# Patient Record
Sex: FEMALE | Race: OTHER | NOT HISPANIC OR LATINO
[De-identification: names, ages, dates, MRNs, and addresses within clinical notes are randomized per-mention and may not be internally consistent; named-entity substitution may affect disease eponyms.]

---

## 2018-03-07 ENCOUNTER — RESULT REVIEW (OUTPATIENT)
Age: 33
End: 2018-03-07

## 2018-03-07 ENCOUNTER — INPATIENT (INPATIENT)
Facility: HOSPITAL | Age: 33
LOS: 1 days | Discharge: HOME | End: 2018-03-09
Attending: SPECIALIST | Admitting: SPECIALIST
Payer: COMMERCIAL

## 2018-03-07 VITALS — WEIGHT: 191.8 LBS | HEIGHT: 65 IN

## 2018-03-07 DIAGNOSIS — Z3A.00 WEEKS OF GESTATION OF PREGNANCY NOT SPECIFIED: ICD-10-CM

## 2018-03-07 DIAGNOSIS — D64.9 ANEMIA, UNSPECIFIED: ICD-10-CM

## 2018-03-07 DIAGNOSIS — Z87.42 PERSONAL HISTORY OF OTHER DISEASES OF THE FEMALE GENITAL TRACT: ICD-10-CM

## 2018-03-07 DIAGNOSIS — O26.899 OTHER SPECIFIED PREGNANCY RELATED CONDITIONS, UNSPECIFIED TRIMESTER: ICD-10-CM

## 2018-03-07 DIAGNOSIS — O99.89 OTHER SPECIFIED DISEASES AND CONDITIONS COMPLICATING PREGNANCY, CHILDBIRTH AND THE PUERPERIUM: ICD-10-CM

## 2018-03-07 LAB
ALBUMIN SERPL ELPH-MCNC: 3.5 G/DL — SIGNIFICANT CHANGE UP (ref 3.3–5)
ALP SERPL-CCNC: 184 U/L — HIGH (ref 40–120)
ALT FLD-CCNC: 16 U/L — SIGNIFICANT CHANGE UP (ref 10–45)
ANION GAP SERPL CALC-SCNC: 17 MMOL/L — SIGNIFICANT CHANGE UP (ref 5–17)
APPEARANCE UR: (no result)
AST SERPL-CCNC: 30 U/L — SIGNIFICANT CHANGE UP (ref 10–40)
BASOPHILS NFR BLD AUTO: 0.1 % — SIGNIFICANT CHANGE UP (ref 0–2)
BILIRUB SERPL-MCNC: 0.4 MG/DL — SIGNIFICANT CHANGE UP (ref 0.2–1.2)
BILIRUB UR-MCNC: NEGATIVE — SIGNIFICANT CHANGE UP
BLD GP AB SCN SERPL QL: NEGATIVE — SIGNIFICANT CHANGE UP
BUN SERPL-MCNC: 7 MG/DL — SIGNIFICANT CHANGE UP (ref 7–23)
CALCIUM SERPL-MCNC: 9.4 MG/DL — SIGNIFICANT CHANGE UP (ref 8.4–10.5)
CHLORIDE SERPL-SCNC: 101 MMOL/L — SIGNIFICANT CHANGE UP (ref 96–108)
CO2 SERPL-SCNC: 16 MMOL/L — LOW (ref 22–31)
COLOR SPEC: YELLOW — SIGNIFICANT CHANGE UP
CREAT SERPL-MCNC: 0.61 MG/DL — SIGNIFICANT CHANGE UP (ref 0.5–1.3)
DIFF PNL FLD: (no result)
GLUCOSE SERPL-MCNC: 118 MG/DL — HIGH (ref 70–99)
GLUCOSE UR QL: NEGATIVE — SIGNIFICANT CHANGE UP
HCT VFR BLD CALC: 40.9 % — SIGNIFICANT CHANGE UP (ref 34.5–45)
HGB BLD-MCNC: 14.3 G/DL — SIGNIFICANT CHANGE UP (ref 11.5–15.5)
KETONES UR-MCNC: >=80 MG/DL
LEUKOCYTE ESTERASE UR-ACNC: NEGATIVE — SIGNIFICANT CHANGE UP
LYMPHOCYTES # BLD AUTO: 5.9 % — LOW (ref 13–44)
MCHC RBC-ENTMCNC: 32.3 PG — SIGNIFICANT CHANGE UP (ref 27–34)
MCHC RBC-ENTMCNC: 35 G/DL — SIGNIFICANT CHANGE UP (ref 32–36)
MCV RBC AUTO: 92.3 FL — SIGNIFICANT CHANGE UP (ref 80–100)
MONOCYTES NFR BLD AUTO: 4.2 % — SIGNIFICANT CHANGE UP (ref 2–14)
NEUTROPHILS NFR BLD AUTO: 89.8 % — HIGH (ref 43–77)
NITRITE UR-MCNC: NEGATIVE — SIGNIFICANT CHANGE UP
PH UR: 6.5 — SIGNIFICANT CHANGE UP (ref 5–8)
PLATELET # BLD AUTO: 160 K/UL — SIGNIFICANT CHANGE UP (ref 150–400)
POTASSIUM SERPL-MCNC: 4 MMOL/L — SIGNIFICANT CHANGE UP (ref 3.5–5.3)
POTASSIUM SERPL-SCNC: 4 MMOL/L — SIGNIFICANT CHANGE UP (ref 3.5–5.3)
PROT SERPL-MCNC: 7.2 G/DL — SIGNIFICANT CHANGE UP (ref 6–8.3)
PROT UR-MCNC: NEGATIVE MG/DL — SIGNIFICANT CHANGE UP
RBC # BLD: 4.43 M/UL — SIGNIFICANT CHANGE UP (ref 3.8–5.2)
RBC # FLD: 14.7 % — SIGNIFICANT CHANGE UP (ref 10.3–16.9)
RH IG SCN BLD-IMP: POSITIVE — SIGNIFICANT CHANGE UP
RH IG SCN BLD-IMP: POSITIVE — SIGNIFICANT CHANGE UP
SODIUM SERPL-SCNC: 134 MMOL/L — LOW (ref 135–145)
SP GR SPEC: 1.01 — SIGNIFICANT CHANGE UP (ref 1–1.03)
UROBILINOGEN FLD QL: 0.2 E.U./DL — SIGNIFICANT CHANGE UP
WBC # BLD: 12.6 K/UL — HIGH (ref 3.8–10.5)
WBC # FLD AUTO: 12.6 K/UL — HIGH (ref 3.8–10.5)

## 2018-03-07 RX ORDER — SIMETHICONE 80 MG/1
80 TABLET, CHEWABLE ORAL EVERY 6 HOURS
Qty: 0 | Refills: 0 | Status: DISCONTINUED | OUTPATIENT
Start: 2018-03-07 | End: 2018-03-09

## 2018-03-07 RX ORDER — DOCUSATE SODIUM 100 MG
100 CAPSULE ORAL THREE TIMES A DAY
Qty: 0 | Refills: 0 | Status: DISCONTINUED | OUTPATIENT
Start: 2018-03-07 | End: 2018-03-09

## 2018-03-07 RX ORDER — DIBUCAINE 1 %
1 OINTMENT (GRAM) RECTAL EVERY 4 HOURS
Qty: 0 | Refills: 0 | Status: DISCONTINUED | OUTPATIENT
Start: 2018-03-07 | End: 2018-03-09

## 2018-03-07 RX ORDER — DOCUSATE SODIUM 100 MG
100 CAPSULE ORAL
Qty: 0 | Refills: 0 | Status: DISCONTINUED | OUTPATIENT
Start: 2018-03-07 | End: 2018-03-08

## 2018-03-07 RX ORDER — SODIUM CHLORIDE 9 MG/ML
3 INJECTION INTRAMUSCULAR; INTRAVENOUS; SUBCUTANEOUS EVERY 8 HOURS
Qty: 0 | Refills: 0 | Status: DISCONTINUED | OUTPATIENT
Start: 2018-03-07 | End: 2018-03-09

## 2018-03-07 RX ORDER — HYDROCORTISONE 1 %
1 OINTMENT (GRAM) TOPICAL EVERY 4 HOURS
Qty: 0 | Refills: 0 | Status: DISCONTINUED | OUTPATIENT
Start: 2018-03-07 | End: 2018-03-09

## 2018-03-07 RX ORDER — DIPHENHYDRAMINE HCL 50 MG
25 CAPSULE ORAL EVERY 6 HOURS
Qty: 0 | Refills: 0 | Status: DISCONTINUED | OUTPATIENT
Start: 2018-03-07 | End: 2018-03-09

## 2018-03-07 RX ORDER — AER TRAVELER 0.5 G/1
1 SOLUTION RECTAL; TOPICAL EVERY 4 HOURS
Qty: 0 | Refills: 0 | Status: DISCONTINUED | OUTPATIENT
Start: 2018-03-07 | End: 2018-03-09

## 2018-03-07 RX ORDER — IBUPROFEN 200 MG
600 TABLET ORAL EVERY 6 HOURS
Qty: 0 | Refills: 0 | Status: DISCONTINUED | OUTPATIENT
Start: 2018-03-07 | End: 2018-03-09

## 2018-03-07 RX ORDER — SODIUM CHLORIDE 9 MG/ML
1000 INJECTION, SOLUTION INTRAVENOUS ONCE
Qty: 0 | Refills: 0 | Status: COMPLETED | OUTPATIENT
Start: 2018-03-07 | End: 2018-03-07

## 2018-03-07 RX ORDER — MAGNESIUM HYDROXIDE 400 MG/1
30 TABLET, CHEWABLE ORAL
Qty: 0 | Refills: 0 | Status: DISCONTINUED | OUTPATIENT
Start: 2018-03-07 | End: 2018-03-09

## 2018-03-07 RX ORDER — SODIUM CHLORIDE 9 MG/ML
1000 INJECTION, SOLUTION INTRAVENOUS
Qty: 0 | Refills: 0 | Status: DISCONTINUED | OUTPATIENT
Start: 2018-03-07 | End: 2018-03-07

## 2018-03-07 RX ORDER — CITRIC ACID/SODIUM CITRATE 300-500 MG
15 SOLUTION, ORAL ORAL EVERY 4 HOURS
Qty: 0 | Refills: 0 | Status: DISCONTINUED | OUTPATIENT
Start: 2018-03-07 | End: 2018-03-07

## 2018-03-07 RX ORDER — OXYTOCIN 10 UNIT/ML
41.67 VIAL (ML) INJECTION
Qty: 20 | Refills: 0 | Status: DISCONTINUED | OUTPATIENT
Start: 2018-03-07 | End: 2018-03-09

## 2018-03-07 RX ORDER — GLYCERIN ADULT
1 SUPPOSITORY, RECTAL RECTAL AT BEDTIME
Qty: 0 | Refills: 0 | Status: DISCONTINUED | OUTPATIENT
Start: 2018-03-07 | End: 2018-03-09

## 2018-03-07 RX ORDER — ACETAMINOPHEN 500 MG
650 TABLET ORAL EVERY 6 HOURS
Qty: 0 | Refills: 0 | Status: DISCONTINUED | OUTPATIENT
Start: 2018-03-07 | End: 2018-03-09

## 2018-03-07 RX ORDER — OXYTOCIN 10 UNIT/ML
1 VIAL (ML) INJECTION
Qty: 30 | Refills: 0 | Status: DISCONTINUED | OUTPATIENT
Start: 2018-03-07 | End: 2018-03-09

## 2018-03-07 RX ORDER — PRAMOXINE HYDROCHLORIDE 150 MG/15G
1 AEROSOL, FOAM RECTAL EVERY 4 HOURS
Qty: 0 | Refills: 0 | Status: DISCONTINUED | OUTPATIENT
Start: 2018-03-07 | End: 2018-03-09

## 2018-03-07 RX ORDER — OXYCODONE AND ACETAMINOPHEN 5; 325 MG/1; MG/1
2 TABLET ORAL EVERY 6 HOURS
Qty: 0 | Refills: 0 | Status: DISCONTINUED | OUTPATIENT
Start: 2018-03-07 | End: 2018-03-09

## 2018-03-07 RX ORDER — OXYTOCIN 10 UNIT/ML
333.33 VIAL (ML) INJECTION
Qty: 20 | Refills: 0 | Status: DISCONTINUED | OUTPATIENT
Start: 2018-03-07 | End: 2018-03-07

## 2018-03-07 RX ORDER — TETANUS TOXOID, REDUCED DIPHTHERIA TOXOID AND ACELLULAR PERTUSSIS VACCINE, ADSORBED 5; 2.5; 8; 8; 2.5 [IU]/.5ML; [IU]/.5ML; UG/.5ML; UG/.5ML; UG/.5ML
0.5 SUSPENSION INTRAMUSCULAR ONCE
Qty: 0 | Refills: 0 | Status: COMPLETED | OUTPATIENT
Start: 2018-03-07 | End: 2019-02-03

## 2018-03-07 RX ORDER — LANOLIN
1 OINTMENT (GRAM) TOPICAL EVERY 6 HOURS
Qty: 0 | Refills: 0 | Status: DISCONTINUED | OUTPATIENT
Start: 2018-03-07 | End: 2018-03-09

## 2018-03-07 RX ADMIN — Medication 1 MILLIUNIT(S)/MIN: at 07:37

## 2018-03-07 RX ADMIN — SODIUM CHLORIDE 2000 MILLILITER(S): 9 INJECTION, SOLUTION INTRAVENOUS at 05:01

## 2018-03-07 RX ADMIN — SODIUM CHLORIDE 3 MILLILITER(S): 9 INJECTION INTRAMUSCULAR; INTRAVENOUS; SUBCUTANEOUS at 22:44

## 2018-03-07 RX ADMIN — Medication 600 MILLIGRAM(S): at 21:58

## 2018-03-07 RX ADMIN — SODIUM CHLORIDE 3 MILLILITER(S): 9 INJECTION INTRAMUSCULAR; INTRAVENOUS; SUBCUTANEOUS at 15:00

## 2018-03-07 RX ADMIN — SODIUM CHLORIDE 125 MILLILITER(S): 9 INJECTION, SOLUTION INTRAVENOUS at 09:37

## 2018-03-07 RX ADMIN — Medication 600 MILLIGRAM(S): at 22:50

## 2018-03-07 RX ADMIN — Medication 100 MILLIGRAM(S): at 21:58

## 2018-03-08 ENCOUNTER — TRANSCRIPTION ENCOUNTER (OUTPATIENT)
Age: 33
End: 2018-03-08

## 2018-03-08 LAB
HCT VFR BLD CALC: 24.6 % — LOW (ref 34.5–45)
HGB BLD-MCNC: 8.1 G/DL — LOW (ref 11.5–15.5)
MCHC RBC-ENTMCNC: 31.8 PG — SIGNIFICANT CHANGE UP (ref 27–34)
MCHC RBC-ENTMCNC: 32.9 G/DL — SIGNIFICANT CHANGE UP (ref 32–36)
MCV RBC AUTO: 96.5 FL — SIGNIFICANT CHANGE UP (ref 80–100)
PLATELET # BLD AUTO: 123 K/UL — LOW (ref 150–400)
RBC # BLD: 2.55 M/UL — LOW (ref 3.8–5.2)
RBC # FLD: 14.6 % — SIGNIFICANT CHANGE UP (ref 10.3–16.9)
T PALLIDUM AB TITR SER: NEGATIVE — SIGNIFICANT CHANGE UP
WBC # BLD: 18.7 K/UL — HIGH (ref 3.8–10.5)
WBC # FLD AUTO: 18.7 K/UL — HIGH (ref 3.8–10.5)

## 2018-03-08 RX ORDER — IBUPROFEN 200 MG
1 TABLET ORAL
Qty: 0 | Refills: 0 | DISCHARGE
Start: 2018-03-08

## 2018-03-08 RX ORDER — DOCUSATE SODIUM 100 MG
1 CAPSULE ORAL
Qty: 0 | Refills: 0 | DISCHARGE
Start: 2018-03-08

## 2018-03-08 RX ORDER — FERROUS SULFATE 325(65) MG
325 TABLET ORAL
Qty: 0 | Refills: 0 | Status: DISCONTINUED | OUTPATIENT
Start: 2018-03-08 | End: 2018-03-09

## 2018-03-08 RX ADMIN — Medication 600 MILLIGRAM(S): at 21:07

## 2018-03-08 RX ADMIN — Medication 600 MILLIGRAM(S): at 15:07

## 2018-03-08 RX ADMIN — SODIUM CHLORIDE 3 MILLILITER(S): 9 INJECTION INTRAMUSCULAR; INTRAVENOUS; SUBCUTANEOUS at 07:09

## 2018-03-08 RX ADMIN — Medication 600 MILLIGRAM(S): at 10:00

## 2018-03-08 RX ADMIN — Medication 600 MILLIGRAM(S): at 03:28

## 2018-03-08 RX ADMIN — Medication 600 MILLIGRAM(S): at 03:09

## 2018-03-08 RX ADMIN — Medication 325 MILLIGRAM(S): at 14:07

## 2018-03-08 RX ADMIN — Medication 1 TABLET(S): at 14:06

## 2018-03-08 RX ADMIN — Medication 600 MILLIGRAM(S): at 09:24

## 2018-03-08 RX ADMIN — SODIUM CHLORIDE 3 MILLILITER(S): 9 INJECTION INTRAMUSCULAR; INTRAVENOUS; SUBCUTANEOUS at 22:17

## 2018-03-08 RX ADMIN — Medication 100 MILLIGRAM(S): at 14:09

## 2018-03-08 RX ADMIN — Medication 100 MILLIGRAM(S): at 07:14

## 2018-03-08 RX ADMIN — Medication 325 MILLIGRAM(S): at 22:21

## 2018-03-08 RX ADMIN — Medication 600 MILLIGRAM(S): at 22:17

## 2018-03-08 RX ADMIN — Medication 600 MILLIGRAM(S): at 15:53

## 2018-03-08 RX ADMIN — SODIUM CHLORIDE 3 MILLILITER(S): 9 INJECTION INTRAMUSCULAR; INTRAVENOUS; SUBCUTANEOUS at 14:34

## 2018-03-08 RX ADMIN — Medication 100 MILLIGRAM(S): at 22:21

## 2018-03-08 NOTE — PROGRESS NOTE ADULT - ASSESSMENT
A/P 32y s/p , PPD # 1 , stable, meeting postpartum milestones   - Pain: well controlled on motrin  - s/p vaginal packing , Hgb 8.1 this morning, no symptoms of anemia, iron TID with colace  - GI: Tolerating regular diet, colace PRN  - : urinating without difficulty/pain  -DVT prophylaxis: ambulating frequently  -Dispo: PPD 2, unless otherwise specified

## 2018-03-08 NOTE — DISCHARGE NOTE OB - CARE PLAN
Principal Discharge DX:	40 weeks gestation of pregnancy  Goal:	be happy  Assessment and plan of treatment:	regular diet, ambulate, breastfeed

## 2018-03-08 NOTE — DISCHARGE NOTE OB - MEDICATION SUMMARY - MEDICATIONS TO TAKE
I will START or STAY ON the medications listed below when I get home from the hospital:    ibuprofen 600 mg oral tablet  -- 1 tab(s) by mouth every 6 hours, As needed, Moderate Pain  -- Indication: For Weeks of gestation of pregnancy not specified    docusate sodium 100 mg oral capsule  -- 1 cap(s) by mouth 3 times a day  -- Indication: For Weeks of gestation of pregnancy not specified

## 2018-03-08 NOTE — LACTATION INITIAL EVALUATION - NS LACT CON REASON FOR REQ
no latch x24 hours/Met dyad at ~26 hours of life. Voided and med'd. Pt states she has been attempting to breastfeed but is not sure baby has ever latched. Assisted in placing baby skin to skin, attempt to latch in laid back position. Baby opens wide and latches but repeatedly pops off breast after one cycle. Demonstrated hand expression and finger fed several drops of colostrum to baby. Baby then latches and sustained suck/swallow pattern for 5-10 minutes before coming off the breast asleep. Encouraged frequent skin to skin and frequent on demand feeds at least 8-12x/day, hand express and offer colostrum via finger to coax baby to the breast and/or for any missed/unsuccessful feeds. Answered basic breastfeeding questions for patient and partner, plan of care discussed with primary RN, encouraged to call for further assistance as needed./primaparous mom

## 2018-03-08 NOTE — PROGRESS NOTE ADULT - SUBJECTIVE AND OBJECTIVE BOX
Patient evaluated at bedside this morning, resting comfortable in bed, no acute events overnight.  She reports pain is well controlled with motrin. States she is feeling well and asymptomatic. Requesting lactation consultation today.   She denies headache, dizziness, chest pain, palpitations, shortness of breath, nausea, vomiting, heavy vaginal bleeding or perineal discomfort. Reports decrease in amount of vaginal bleeding and denies clots.  She has been ambulating without assistance, voiding spontaneously, and is breastfeeding.   Tolerating food well, without nausea/vomit.  Passing flatus.     Physical Exam:  T(C): 36.7 (03-08-18 @ 08:05), Max: 36.7 (03-08-18 @ 08:05)  HR: 89 (03-08-18 @ 08:05) (89 - 91)  BP: 117/72 (03-08-18 @ 08:05) (101/63 - 117/72)  RR: 18 (03-08-18 @ 08:05) (17 - 18)  SpO2: 97% (03-08-18 @ 08:05) (97% - 98%)    GA: NAD, A&O x 3  CV: RRR, no murmurs, rubs, or gallops  Pulm: clear breath sounds throughout, no rales, rhonchi, wheezes  Abd: + BS, soft, nontender, nondistended, no rebound or guarding, uterus firm at midline, uterus firm and   2 fb below umbilicus  Extremities: no swelling or calf tenderness                          8.1    18.7  )-----------( 123      ( 08 Mar 2018 05:45 )             24.6     03-07    134<L>  |  101  |  7   ----------------------------<  118<H>  4.0   |  16<L>  |  0.61    Ca    9.4      07 Mar 2018 04:06    TPro  7.2  /  Alb  3.5  /  TBili  0.4  /  DBili  x   /  AST  30  /  ALT  16  /  AlkPhos  184<H>  03-07    acetaminophen   Tablet 650 milliGRAM(s) Oral every 6 hours PRN  acetaminophen   Tablet. 650 milliGRAM(s) Oral every 6 hours PRN  dibucaine 1% Ointment 1 Application(s) Topical every 4 hours PRN  diphenhydrAMINE   Capsule 25 milliGRAM(s) Oral every 6 hours PRN  diphtheria/tetanus/pertussis (acellular) Vaccine (ADAcel) 0.5 milliLiter(s) IntraMuscular once  docusate sodium 100 milliGRAM(s) Oral three times a day  fentaNYL (2 MICROgram(s)/mL) + BUpivacaine 0.1% in 0.9% Sodium Chloride PCEA 250 milliLiter(s) Epidural PCA Continuous  glycerin Suppository - Adult 1 Suppository(s) Rectal at bedtime PRN  hydrocortisone 1% Cream 1 Application(s) Topical every 4 hours PRN  ibuprofen  Tablet 600 milliGRAM(s) Oral every 6 hours PRN  lanolin Ointment 1 Application(s) Topical every 6 hours PRN  magnesium hydroxide Suspension 30 milliLiter(s) Oral two times a day PRN  oxyCODONE    5 mG/acetaminophen 325 mG 2 Tablet(s) Oral every 6 hours PRN  oxytocin Infusion 41.667 milliUNIT(s)/Min IV Continuous <Continuous>  oxytocin Infusion 1 milliUNIT(s)/Min IV Continuous <Continuous>  pramoxine 1%/zinc 5% Cream 1 Application(s) Topical every 4 hours PRN  prenatal multivitamin 1 Tablet(s) Oral daily  simethicone 80 milliGRAM(s) Chew every 6 hours PRN  sodium chloride 0.9% lock flush 3 milliLiter(s) IV Push every 8 hours  witch hazel Pads 1 Application(s) Topical every 4 hours PRN

## 2018-03-08 NOTE — DISCHARGE NOTE OB - PATIENT PORTAL LINK FT
You can access the [facility] Patient Portal, offered by [facility], by registering with the following website: [email or web address]

## 2018-03-08 NOTE — DISCHARGE NOTE OB - CARE PROVIDER_API CALL
Merlyn Clark), Obstetrics and Gynecology  [address] [place] [address]  Phone: [phone]  Fax: [phone]

## 2018-03-09 VITALS
OXYGEN SATURATION: 98 % | HEART RATE: 94 BPM | DIASTOLIC BLOOD PRESSURE: 80 MMHG | RESPIRATION RATE: 18 BRPM | TEMPERATURE: 98 F | SYSTOLIC BLOOD PRESSURE: 136 MMHG

## 2018-03-09 LAB — SURGICAL PATHOLOGY STUDY: SIGNIFICANT CHANGE UP

## 2018-03-09 PROCEDURE — 85025 COMPLETE CBC W/AUTO DIFF WBC: CPT

## 2018-03-09 PROCEDURE — 99214 OFFICE O/P EST MOD 30 MIN: CPT

## 2018-03-09 PROCEDURE — 80053 COMPREHEN METABOLIC PANEL: CPT

## 2018-03-09 PROCEDURE — 84550 ASSAY OF BLOOD/URIC ACID: CPT

## 2018-03-09 PROCEDURE — 86900 BLOOD TYPING SEROLOGIC ABO: CPT

## 2018-03-09 PROCEDURE — 86850 RBC ANTIBODY SCREEN: CPT

## 2018-03-09 PROCEDURE — 85027 COMPLETE CBC AUTOMATED: CPT

## 2018-03-09 PROCEDURE — 83615 LACTATE (LD) (LDH) ENZYME: CPT

## 2018-03-09 PROCEDURE — 86901 BLOOD TYPING SEROLOGIC RH(D): CPT

## 2018-03-09 PROCEDURE — 88307 TISSUE EXAM BY PATHOLOGIST: CPT

## 2018-03-09 PROCEDURE — 90715 TDAP VACCINE 7 YRS/> IM: CPT

## 2018-03-09 PROCEDURE — 36415 COLL VENOUS BLD VENIPUNCTURE: CPT

## 2018-03-09 PROCEDURE — 81001 URINALYSIS AUTO W/SCOPE: CPT

## 2018-03-09 PROCEDURE — 86780 TREPONEMA PALLIDUM: CPT

## 2018-03-09 RX ORDER — TETANUS TOXOID, REDUCED DIPHTHERIA TOXOID AND ACELLULAR PERTUSSIS VACCINE, ADSORBED 5; 2.5; 8; 8; 2.5 [IU]/.5ML; [IU]/.5ML; UG/.5ML; UG/.5ML; UG/.5ML
0.5 SUSPENSION INTRAMUSCULAR ONCE
Qty: 0 | Refills: 0 | Status: COMPLETED | OUTPATIENT
Start: 2018-03-09 | End: 2018-03-09

## 2018-03-09 RX ADMIN — Medication 600 MILLIGRAM(S): at 09:39

## 2018-03-09 RX ADMIN — Medication 600 MILLIGRAM(S): at 10:30

## 2018-03-09 RX ADMIN — Medication 600 MILLIGRAM(S): at 03:30

## 2018-03-09 RX ADMIN — Medication 1 TABLET(S): at 13:41

## 2018-03-09 RX ADMIN — Medication 100 MILLIGRAM(S): at 06:37

## 2018-03-09 RX ADMIN — TETANUS TOXOID, REDUCED DIPHTHERIA TOXOID AND ACELLULAR PERTUSSIS VACCINE, ADSORBED 0.5 MILLILITER(S): 5; 2.5; 8; 8; 2.5 SUSPENSION INTRAMUSCULAR at 13:41

## 2018-03-09 RX ADMIN — Medication 600 MILLIGRAM(S): at 04:00

## 2018-03-09 RX ADMIN — Medication 325 MILLIGRAM(S): at 06:37

## 2018-03-09 RX ADMIN — Medication 325 MILLIGRAM(S): at 13:41

## 2018-03-09 RX ADMIN — Medication 100 MILLIGRAM(S): at 13:41

## 2018-03-09 NOTE — PROGRESS NOTE ADULT - SUBJECTIVE AND OBJECTIVE BOX
Patient evaluated at bedside this morning, resting comfortable in bed, no acute events overnight.  She reports pain is well controlled with motrin. States she is feeling well and denies all symptoms of anemia. Taking iron three times per day with colace.  She denies headache, dizziness, chest pain, palpitations, shortness of breath, nausea, vomiting, heavy vaginal bleeding or perineal discomfort. Reports decrease in amount of vaginal bleeding and denies clots.  She has been ambulating without assistance, voiding spontaneously, and is breastfeeding.   Tolerating food well, without nausea/vomit.  Passing flatus.     Physical Exam:  T(C): 36.8 (03-09-18 @ 06:00), Max: 36.8 (03-09-18 @ 06:00)  HR: 93 (03-09-18 @ 06:00) (93 - 93)  BP: 106/65 (03-09-18 @ 06:00) (106/65 - 106/65)  RR: 18 (03-09-18 @ 06:00) (18 - 18)  SpO2: 98% (03-09-18 @ 06:00) (98% - 98%)    GA: NAD, A&O x 3  CV: RRR, no murmurs, rubs, or gallops  Pulm: clear breath sounds throughout, no rales, rhonchi, wheezes  Abd: + BS, soft, nontender, nondistended, no rebound or guarding, uterus firm at midline, uterus firm and  2 fb below umbilicus  : lochia WNL  Extremities: no swelling or calf tenderness                          8.1    18.7  )-----------( 123      ( 08 Mar 2018 05:45 )             24.6           acetaminophen   Tablet 650 milliGRAM(s) Oral every 6 hours PRN  acetaminophen   Tablet. 650 milliGRAM(s) Oral every 6 hours PRN  dibucaine 1% Ointment 1 Application(s) Topical every 4 hours PRN  diphenhydrAMINE   Capsule 25 milliGRAM(s) Oral every 6 hours PRN  diphtheria/tetanus/pertussis (acellular) Vaccine (ADAcel) 0.5 milliLiter(s) IntraMuscular once  docusate sodium 100 milliGRAM(s) Oral three times a day  fentaNYL (2 MICROgram(s)/mL) + BUpivacaine 0.1% in 0.9% Sodium Chloride PCEA 250 milliLiter(s) Epidural PCA Continuous  ferrous    sulfate 325 milliGRAM(s) Oral three times a day with meals  glycerin Suppository - Adult 1 Suppository(s) Rectal at bedtime PRN  hydrocortisone 1% Cream 1 Application(s) Topical every 4 hours PRN  ibuprofen  Tablet 600 milliGRAM(s) Oral every 6 hours PRN  lanolin Ointment 1 Application(s) Topical every 6 hours PRN  magnesium hydroxide Suspension 30 milliLiter(s) Oral two times a day PRN  oxyCODONE    5 mG/acetaminophen 325 mG 2 Tablet(s) Oral every 6 hours PRN  oxytocin Infusion 41.667 milliUNIT(s)/Min IV Continuous <Continuous>  oxytocin Infusion 1 milliUNIT(s)/Min IV Continuous <Continuous>  pramoxine 1%/zinc 5% Cream 1 Application(s) Topical every 4 hours PRN  prenatal multivitamin 1 Tablet(s) Oral daily  simethicone 80 milliGRAM(s) Chew every 6 hours PRN  sodium chloride 0.9% lock flush 3 milliLiter(s) IV Push every 8 hours  witch hazel Pads 1 Application(s) Topical every 4 hours PRN

## 2018-03-09 NOTE — PROGRESS NOTE ADULT - ASSESSMENT
A/P 32y s/p , PPD #2  , stable, meeting postpartum milestones   - Pain: well controlled on motrin  - Anemia: post delivery Hgb 8.1, on iron TID, no symptoms of anemia and VSS  - GI: Tolerating regular diet, colace PRN  - : urinating without difficulty/pain  -DVT prophylaxis: ambulating frequently  -Dispo: PPD 2, d/c instructions discussed

## 2018-03-13 DIAGNOSIS — Z3A.39 39 WEEKS GESTATION OF PREGNANCY: ICD-10-CM

## 2018-03-13 DIAGNOSIS — Z34.90 ENCOUNTER FOR SUPERVISION OF NORMAL PREGNANCY, UNSPECIFIED, UNSPECIFIED TRIMESTER: ICD-10-CM

## 2018-04-17 PROBLEM — Z00.00 ENCOUNTER FOR PREVENTIVE HEALTH EXAMINATION: Status: ACTIVE | Noted: 2018-04-17

## 2018-04-24 ENCOUNTER — OTHER (OUTPATIENT)
Age: 33
End: 2018-04-24

## 2018-04-27 ENCOUNTER — APPOINTMENT (OUTPATIENT)
Dept: BREAST CENTER | Facility: CLINIC | Age: 33
End: 2018-04-27

## 2019-11-12 ENCOUNTER — TRANSCRIPTION ENCOUNTER (OUTPATIENT)
Age: 34
End: 2019-11-12

## 2019-11-13 ENCOUNTER — INPATIENT (INPATIENT)
Facility: HOSPITAL | Age: 34
LOS: 1 days | Discharge: HOME | End: 2019-11-15
Attending: OBSTETRICS & GYNECOLOGY | Admitting: OBSTETRICS & GYNECOLOGY
Payer: COMMERCIAL

## 2019-11-13 VITALS — WEIGHT: 194.01 LBS | HEIGHT: 65 IN

## 2019-11-13 DIAGNOSIS — O26.899 OTHER SPECIFIED PREGNANCY RELATED CONDITIONS, UNSPECIFIED TRIMESTER: ICD-10-CM

## 2019-11-13 DIAGNOSIS — Z34.80 ENCOUNTER FOR SUPERVISION OF OTHER NORMAL PREGNANCY, UNSPECIFIED TRIMESTER: ICD-10-CM

## 2019-11-13 DIAGNOSIS — Z3A.00 WEEKS OF GESTATION OF PREGNANCY NOT SPECIFIED: ICD-10-CM

## 2019-11-13 LAB
BASOPHILS # BLD AUTO: 0.02 K/UL — SIGNIFICANT CHANGE UP (ref 0–0.2)
BASOPHILS NFR BLD AUTO: 0.2 % — SIGNIFICANT CHANGE UP (ref 0–2)
BLD GP AB SCN SERPL QL: NEGATIVE — SIGNIFICANT CHANGE UP
EOSINOPHIL # BLD AUTO: 0.02 K/UL — SIGNIFICANT CHANGE UP (ref 0–0.5)
EOSINOPHIL NFR BLD AUTO: 0.2 % — SIGNIFICANT CHANGE UP (ref 0–6)
HCT VFR BLD CALC: 37.7 % — SIGNIFICANT CHANGE UP (ref 34.5–45)
HGB BLD-MCNC: 13 G/DL — SIGNIFICANT CHANGE UP (ref 11.5–15.5)
IMM GRANULOCYTES NFR BLD AUTO: 0.4 % — SIGNIFICANT CHANGE UP (ref 0–1.5)
LYMPHOCYTES # BLD AUTO: 1.27 K/UL — SIGNIFICANT CHANGE UP (ref 1–3.3)
LYMPHOCYTES # BLD AUTO: 11.5 % — LOW (ref 13–44)
MCHC RBC-ENTMCNC: 30.4 PG — SIGNIFICANT CHANGE UP (ref 27–34)
MCHC RBC-ENTMCNC: 34.5 GM/DL — SIGNIFICANT CHANGE UP (ref 32–36)
MCV RBC AUTO: 88.1 FL — SIGNIFICANT CHANGE UP (ref 80–100)
MONOCYTES # BLD AUTO: 0.62 K/UL — SIGNIFICANT CHANGE UP (ref 0–0.9)
MONOCYTES NFR BLD AUTO: 5.6 % — SIGNIFICANT CHANGE UP (ref 2–14)
NEUTROPHILS # BLD AUTO: 9.04 K/UL — HIGH (ref 1.8–7.4)
NEUTROPHILS NFR BLD AUTO: 82.1 % — HIGH (ref 43–77)
NRBC # BLD: 0 /100 WBCS — SIGNIFICANT CHANGE UP (ref 0–0)
PLATELET # BLD AUTO: 184 K/UL — SIGNIFICANT CHANGE UP (ref 150–400)
RBC # BLD: 4.28 M/UL — SIGNIFICANT CHANGE UP (ref 3.8–5.2)
RBC # FLD: 15.2 % — HIGH (ref 10.3–14.5)
RH IG SCN BLD-IMP: POSITIVE — SIGNIFICANT CHANGE UP
RH IG SCN BLD-IMP: POSITIVE — SIGNIFICANT CHANGE UP
T PALLIDUM AB TITR SER: NEGATIVE — SIGNIFICANT CHANGE UP
WBC # BLD: 11.01 K/UL — HIGH (ref 3.8–10.5)
WBC # FLD AUTO: 11.01 K/UL — HIGH (ref 3.8–10.5)

## 2019-11-13 PROCEDURE — 88307 TISSUE EXAM BY PATHOLOGIST: CPT | Mod: 26

## 2019-11-13 PROCEDURE — 59409 OBSTETRICAL CARE: CPT

## 2019-11-13 RX ORDER — DIPHENHYDRAMINE HCL 50 MG
25 CAPSULE ORAL EVERY 6 HOURS
Refills: 0 | Status: DISCONTINUED | OUTPATIENT
Start: 2019-11-13 | End: 2019-11-15

## 2019-11-13 RX ORDER — OXYCODONE HYDROCHLORIDE 5 MG/1
5 TABLET ORAL ONCE
Refills: 0 | Status: DISCONTINUED | OUTPATIENT
Start: 2019-11-13 | End: 2019-11-15

## 2019-11-13 RX ORDER — GLYCERIN ADULT
1 SUPPOSITORY, RECTAL RECTAL AT BEDTIME
Refills: 0 | Status: DISCONTINUED | OUTPATIENT
Start: 2019-11-13 | End: 2019-11-15

## 2019-11-13 RX ORDER — DIBUCAINE 1 %
1 OINTMENT (GRAM) RECTAL EVERY 6 HOURS
Refills: 0 | Status: DISCONTINUED | OUTPATIENT
Start: 2019-11-13 | End: 2019-11-15

## 2019-11-13 RX ORDER — BENZOCAINE 10 %
1 GEL (GRAM) MUCOUS MEMBRANE EVERY 6 HOURS
Refills: 0 | Status: DISCONTINUED | OUTPATIENT
Start: 2019-11-13 | End: 2019-11-15

## 2019-11-13 RX ORDER — SODIUM CHLORIDE 9 MG/ML
1000 INJECTION, SOLUTION INTRAVENOUS
Refills: 0 | Status: DISCONTINUED | OUTPATIENT
Start: 2019-11-13 | End: 2019-11-13

## 2019-11-13 RX ORDER — SIMETHICONE 80 MG/1
80 TABLET, CHEWABLE ORAL EVERY 4 HOURS
Refills: 0 | Status: DISCONTINUED | OUTPATIENT
Start: 2019-11-13 | End: 2019-11-15

## 2019-11-13 RX ORDER — OXYCODONE HYDROCHLORIDE 5 MG/1
5 TABLET ORAL
Refills: 0 | Status: DISCONTINUED | OUTPATIENT
Start: 2019-11-13 | End: 2019-11-15

## 2019-11-13 RX ORDER — AER TRAVELER 0.5 G/1
1 SOLUTION RECTAL; TOPICAL EVERY 4 HOURS
Refills: 0 | Status: DISCONTINUED | OUTPATIENT
Start: 2019-11-13 | End: 2019-11-15

## 2019-11-13 RX ORDER — KETOROLAC TROMETHAMINE 30 MG/ML
30 SYRINGE (ML) INJECTION ONCE
Refills: 0 | Status: DISCONTINUED | OUTPATIENT
Start: 2019-11-13 | End: 2019-11-13

## 2019-11-13 RX ORDER — MAGNESIUM HYDROXIDE 400 MG/1
30 TABLET, CHEWABLE ORAL
Refills: 0 | Status: DISCONTINUED | OUTPATIENT
Start: 2019-11-13 | End: 2019-11-15

## 2019-11-13 RX ORDER — TETANUS TOXOID, REDUCED DIPHTHERIA TOXOID AND ACELLULAR PERTUSSIS VACCINE, ADSORBED 5; 2.5; 8; 8; 2.5 [IU]/.5ML; [IU]/.5ML; UG/.5ML; UG/.5ML; UG/.5ML
0.5 SUSPENSION INTRAMUSCULAR ONCE
Refills: 0 | Status: COMPLETED | OUTPATIENT
Start: 2019-11-13

## 2019-11-13 RX ORDER — CITRIC ACID/SODIUM CITRATE 300-500 MG
15 SOLUTION, ORAL ORAL EVERY 6 HOURS
Refills: 0 | Status: DISCONTINUED | OUTPATIENT
Start: 2019-11-13 | End: 2019-11-13

## 2019-11-13 RX ORDER — IBUPROFEN 200 MG
600 TABLET ORAL EVERY 6 HOURS
Refills: 0 | Status: DISCONTINUED | OUTPATIENT
Start: 2019-11-13 | End: 2019-11-15

## 2019-11-13 RX ORDER — HYDROCORTISONE 1 %
1 OINTMENT (GRAM) TOPICAL EVERY 6 HOURS
Refills: 0 | Status: DISCONTINUED | OUTPATIENT
Start: 2019-11-13 | End: 2019-11-15

## 2019-11-13 RX ORDER — ACETAMINOPHEN 500 MG
975 TABLET ORAL
Refills: 0 | Status: DISCONTINUED | OUTPATIENT
Start: 2019-11-13 | End: 2019-11-15

## 2019-11-13 RX ORDER — LANOLIN
1 OINTMENT (GRAM) TOPICAL EVERY 6 HOURS
Refills: 0 | Status: DISCONTINUED | OUTPATIENT
Start: 2019-11-13 | End: 2019-11-15

## 2019-11-13 RX ORDER — OXYTOCIN 10 UNIT/ML
333.33 VIAL (ML) INJECTION
Qty: 20 | Refills: 0 | Status: DISCONTINUED | OUTPATIENT
Start: 2019-11-13 | End: 2019-11-15

## 2019-11-13 RX ORDER — IBUPROFEN 200 MG
600 TABLET ORAL EVERY 6 HOURS
Refills: 0 | Status: COMPLETED | OUTPATIENT
Start: 2019-11-13 | End: 2020-10-11

## 2019-11-13 RX ORDER — PRAMOXINE HYDROCHLORIDE 150 MG/15G
1 AEROSOL, FOAM RECTAL EVERY 4 HOURS
Refills: 0 | Status: DISCONTINUED | OUTPATIENT
Start: 2019-11-13 | End: 2019-11-15

## 2019-11-13 RX ORDER — SODIUM CHLORIDE 9 MG/ML
3 INJECTION INTRAMUSCULAR; INTRAVENOUS; SUBCUTANEOUS EVERY 8 HOURS
Refills: 0 | Status: DISCONTINUED | OUTPATIENT
Start: 2019-11-13 | End: 2019-11-15

## 2019-11-13 RX ADMIN — Medication 975 MILLIGRAM(S): at 23:03

## 2019-11-13 RX ADMIN — Medication 1000 MILLIUNIT(S)/MIN: at 09:52

## 2019-11-13 RX ADMIN — Medication 975 MILLIGRAM(S): at 22:03

## 2019-11-13 RX ADMIN — Medication 30 MILLIGRAM(S): at 09:49

## 2019-11-13 NOTE — PATIENT PROFILE OB - TRANSPORTATION AVAILABLE, PROFILE
Wound Crusting?: clean Body Location Override (Optional): right distal forearm Sutures?: intact Follow Up Time Frame (Optional): days Wound Edema?: mild Follow Up Provider (Optional): Dr. Isael Hooper Wound Diameter In Cm(Optional): 0 Wound Evaluated By (Optional): Dr. Debora Rodriguez Detail Level: Detailed Follow Up Units (Optional): 3 Add 58820 Cpt? (Important Note: In 2017 The Use Of 10981 Is Being Tracked By Cms To Determine Future Global Period Reimbursement For Global Periods): yes family or friend will provide

## 2019-11-14 ENCOUNTER — TRANSCRIPTION ENCOUNTER (OUTPATIENT)
Age: 34
End: 2019-11-14

## 2019-11-14 LAB
HCT VFR BLD CALC: 32.1 % — LOW (ref 34.5–45)
HGB BLD-MCNC: 10.4 G/DL — LOW (ref 11.5–15.5)

## 2019-11-14 RX ORDER — ACETAMINOPHEN 500 MG
3 TABLET ORAL
Qty: 0 | Refills: 0 | DISCHARGE

## 2019-11-14 RX ORDER — TETANUS TOXOID, REDUCED DIPHTHERIA TOXOID AND ACELLULAR PERTUSSIS VACCINE, ADSORBED 5; 2.5; 8; 8; 2.5 [IU]/.5ML; [IU]/.5ML; UG/.5ML; UG/.5ML; UG/.5ML
0.5 SUSPENSION INTRAMUSCULAR ONCE
Refills: 0 | Status: COMPLETED | OUTPATIENT
Start: 2019-11-14 | End: 2019-11-14

## 2019-11-14 RX ORDER — IBUPROFEN 200 MG
3 TABLET ORAL
Qty: 0 | Refills: 0 | DISCHARGE

## 2019-11-14 RX ORDER — DOCUSATE SODIUM 100 MG
1 CAPSULE ORAL
Qty: 0 | Refills: 0 | DISCHARGE

## 2019-11-14 RX ADMIN — Medication 1 TABLET(S): at 12:57

## 2019-11-14 RX ADMIN — Medication 975 MILLIGRAM(S): at 09:15

## 2019-11-14 RX ADMIN — Medication 975 MILLIGRAM(S): at 23:48

## 2019-11-14 RX ADMIN — TETANUS TOXOID, REDUCED DIPHTHERIA TOXOID AND ACELLULAR PERTUSSIS VACCINE, ADSORBED 0.5 MILLILITER(S): 5; 2.5; 8; 8; 2.5 SUSPENSION INTRAMUSCULAR at 23:50

## 2019-11-14 RX ADMIN — Medication 600 MILLIGRAM(S): at 06:16

## 2019-11-14 RX ADMIN — Medication 600 MILLIGRAM(S): at 12:57

## 2019-11-14 RX ADMIN — Medication 600 MILLIGRAM(S): at 19:50

## 2019-11-14 RX ADMIN — Medication 975 MILLIGRAM(S): at 16:47

## 2019-11-14 RX ADMIN — Medication 975 MILLIGRAM(S): at 04:36

## 2019-11-14 RX ADMIN — Medication 600 MILLIGRAM(S): at 20:07

## 2019-11-14 RX ADMIN — Medication 975 MILLIGRAM(S): at 03:36

## 2019-11-14 RX ADMIN — Medication 975 MILLIGRAM(S): at 08:43

## 2019-11-14 RX ADMIN — Medication 600 MILLIGRAM(S): at 00:09

## 2019-11-14 RX ADMIN — Medication 600 MILLIGRAM(S): at 13:30

## 2019-11-14 RX ADMIN — Medication 975 MILLIGRAM(S): at 17:50

## 2019-11-14 RX ADMIN — Medication 600 MILLIGRAM(S): at 01:09

## 2019-11-14 NOTE — DISCHARGE NOTE OB - PATIENT PORTAL LINK FT
You can access the FollowMyHealth Patient Portal offered by [facility] by registering at the following website: [email or web address]. By joining [facility] FollowMyHealth portal, you will also be able to view your health information using other applications (apps) compatible with our system.

## 2019-11-14 NOTE — DISCHARGE NOTE OB - PLAN OF CARE
wellbeing After discharge, please stay on pelvic rest for 6 weeks, meaning no sexual intercourse, no tampons and no douching.  No driving for 2 weeks as women can loose a lot of blood during delivery and there is a possibility of being lightheaded/fainting.  No lifting objects heavier than baby for two weeks.  Expect to have vaginal bleeding/spotting for up to six weeks.  The bleeding should get lighter and more white/light brown with time.  For bleeding soaking more than a pad an hour or passing clots greater than the size of your fist, come in to the emergency department.    Follow up in clinic in 6 weeks.

## 2019-11-14 NOTE — DISCHARGE NOTE OB - CARE PROVIDER_API CALL
[facility],   [address] [place] [address]  2nd floor  Phone: [phone]  Fax: (   )    -  Follow Up Time:

## 2019-11-14 NOTE — DISCHARGE NOTE OB - ADDITIONAL INSTRUCTIONS
After discharge, please stay on pelvic rest for 6 weeks, meaning no sexual intercourse, no tampons and no douching.  No driving for 2 weeks as women can loose a lot of blood during delivery and there is a possibility of being lightheaded/fainting.  No lifting objects heavier than baby for two weeks.  Expect to have vaginal bleeding/spotting for up to six weeks.  The bleeding should get lighter and more white/light brown with time.  For bleeding soaking more than a pad an hour or passing clots greater than the size of your fist, come in to the emergency department.    Follow up in clinic in 6 weeks.

## 2019-11-14 NOTE — DISCHARGE NOTE OB - HOSPITAL COURSE
Patient had uncomplicated, nonsurgical vaginal delivery.  Please see delivery note for details.  During postpartum course patient's vitals were stable, vaginal bleeding appropriate, and pain well controlled.  On day of discharge patient was ambulating, her pain controlled with oral medications, had adequate oral intake, and was voiding freely.  Discharge instructions and precautions were given.  Will return to clinic in 6 weeks for postpartum visit.  Postpartum birth control plan is interval IUD.

## 2019-11-14 NOTE — DISCHARGE NOTE OB - CARE PLAN
Principal Discharge DX:	Vaginal delivery  Goal:	wellbeing  Assessment and plan of treatment:	After discharge, please stay on pelvic rest for 6 weeks, meaning no sexual intercourse, no tampons and no douching.  No driving for 2 weeks as women can loose a lot of blood during delivery and there is a possibility of being lightheaded/fainting.  No lifting objects heavier than baby for two weeks.  Expect to have vaginal bleeding/spotting for up to six weeks.  The bleeding should get lighter and more white/light brown with time.  For bleeding soaking more than a pad an hour or passing clots greater than the size of your fist, come in to the emergency department.    Follow up in clinic in 6 weeks.

## 2019-11-14 NOTE — DISCHARGE NOTE OB - PROVIDER TOKENS
FREE:[LAST:[[facility]],PHONE:[phone]],FAX:[(   )    -],ADDRESS:[[address] [place] [address]]]

## 2019-11-14 NOTE — DISCHARGE NOTE OB - MEDICATION SUMMARY - MEDICATIONS TO TAKE
I will START or STAY ON the medications listed below when I get home from the hospital:    acetaminophen 325 mg oral tablet  -- 3 tab(s) by mouth every 6 hours  -- Indication: For mild pain    ibuprofen 200 mg oral tablet  -- 3 tab(s) by mouth every 6 hours  -- Indication: For moderate pain    Colace 100 mg oral capsule  -- 1 cap(s) by mouth 2 times a day  -- Indication: For Stool softener

## 2019-11-14 NOTE — PROGRESS NOTE ADULT - PROBLEM SELECTOR PLAN 1
- Pain well controlled, continue current pain regimen  - Increase ambulation, SCDs when not ambulating  - Continue regular diet  - Baby boy to be circumcised pending clearance by pediatrics  - Pt will have interval IUD placed, declines bridging contraception  - f/u PPD#1 H&H    Alejandra Campos, PGY-1

## 2019-11-14 NOTE — PROGRESS NOTE ADULT - SUBJECTIVE AND OBJECTIVE BOX
OB Progress Note:  PPD#1    S: 33yo  PPD#1 s/p . Patient feels well. Pain is well controlled. She is tolerating a regular diet and passing flatus. She is voiding spontaneously, and ambulating without difficulty. Denies CP/SOB. Denies lightheadedness/dizziness. Denies N/V.    O:  Vitals:  Vital Signs Last 24 Hrs  T(C): 36.3 (2019 06:34), Max: 37.2 (2019 09:45)  T(F): 97.3 (2019 06:34), Max: 99 (2019 09:45)  HR: 74 (2019 06:34) (60 - 88)  BP: 106/65 (2019 06:34) (101/70 - 129/71)  BP(mean): 89 (2019 10:45) (89 - 89)  RR: 18 (2019 06:34) (16 - 18)  SpO2: 97% (2019 06:34) (96% - 98%)    MEDICATIONS  (STANDING):  acetaminophen   Tablet .. 975 milliGRAM(s) Oral <User Schedule>  diphtheria/tetanus/pertussis (acellular) Vaccine (ADAcel) 0.5 milliLiter(s) IntraMuscular once  ibuprofen  Tablet. 600 milliGRAM(s) Oral every 6 hours  oxytocin Infusion 333.333 milliUNIT(s)/Min (1000 mL/Hr) IV Continuous <Continuous>  oxytocin Infusion 333.333 milliUNIT(s)/Min (1000 mL/Hr) IV Continuous <Continuous>  oxytocin Infusion 333.333 milliUNIT(s)/Min (1000 mL/Hr) IV Continuous <Continuous>  prenatal multivitamin 1 Tablet(s) Oral daily  sodium chloride 0.9% lock flush 3 milliLiter(s) IV Push every 8 hours      Labs:  Blood type: O Positive  Rubella IgG: RPR: Negative                          13.0   11.01<H> >-----------< 184    (  @ 02:38 )             37.7      Physical Exam:  General: NAD  Abdomen: soft, non-tender, non-distended, fundus firm  Vaginal: Lochia wnl  Extremities: No erythema/edema

## 2019-11-15 VITALS
DIASTOLIC BLOOD PRESSURE: 69 MMHG | TEMPERATURE: 98 F | RESPIRATION RATE: 18 BRPM | HEART RATE: 70 BPM | SYSTOLIC BLOOD PRESSURE: 107 MMHG

## 2019-11-15 PROCEDURE — 85014 HEMATOCRIT: CPT

## 2019-11-15 PROCEDURE — 85027 COMPLETE CBC AUTOMATED: CPT

## 2019-11-15 PROCEDURE — G0463: CPT

## 2019-11-15 PROCEDURE — 88307 TISSUE EXAM BY PATHOLOGIST: CPT

## 2019-11-15 PROCEDURE — 86901 BLOOD TYPING SEROLOGIC RH(D): CPT

## 2019-11-15 PROCEDURE — 86780 TREPONEMA PALLIDUM: CPT

## 2019-11-15 PROCEDURE — 59025 FETAL NON-STRESS TEST: CPT

## 2019-11-15 PROCEDURE — 90715 TDAP VACCINE 7 YRS/> IM: CPT

## 2019-11-15 PROCEDURE — 86850 RBC ANTIBODY SCREEN: CPT

## 2019-11-15 PROCEDURE — 59050 FETAL MONITOR W/REPORT: CPT

## 2019-11-15 PROCEDURE — 86900 BLOOD TYPING SEROLOGIC ABO: CPT

## 2019-11-15 PROCEDURE — 85018 HEMOGLOBIN: CPT

## 2019-11-15 RX ADMIN — Medication 975 MILLIGRAM(S): at 06:09

## 2019-11-15 RX ADMIN — Medication 1 TABLET(S): at 12:32

## 2019-11-15 RX ADMIN — Medication 975 MILLIGRAM(S): at 00:33

## 2019-11-15 RX ADMIN — Medication 975 MILLIGRAM(S): at 13:05

## 2019-11-15 RX ADMIN — Medication 600 MILLIGRAM(S): at 09:33

## 2019-11-15 RX ADMIN — Medication 975 MILLIGRAM(S): at 12:33

## 2019-11-15 RX ADMIN — Medication 600 MILLIGRAM(S): at 03:46

## 2019-11-15 RX ADMIN — Medication 600 MILLIGRAM(S): at 02:50

## 2019-11-15 RX ADMIN — Medication 600 MILLIGRAM(S): at 10:05

## 2019-11-15 RX ADMIN — Medication 975 MILLIGRAM(S): at 07:04

## 2019-11-15 NOTE — PROGRESS NOTE ADULT - PROBLEM SELECTOR PLAN 1
- Pain well controlled, continue current pain regimen  - Increase ambulation, SCDs when not ambulating  - Continue regular diet  - Pt will follow up w/ Dr. Bernal for interval IUD placement  - Pt counseled nothing per vagina x6 wks  - Baby boy circumcised yesterday  - Discharge planning     Alejandra Campos, PGY-1

## 2019-11-23 LAB — SURGICAL PATHOLOGY STUDY: SIGNIFICANT CHANGE UP

## 2023-03-29 NOTE — PATIENT PROFILE OB - TEACHING/LEARNING FACTORS IMPACT ABILITY TO LEARN
Patient calling to see if you could review her urine results and see if she needs to start any antibiotics  If you would review them today she would need the medication sent to [facility]  none